# Patient Record
(demographics unavailable — no encounter records)

---

## 2025-02-06 NOTE — DISCUSSION/SUMMARY
[FreeTextEntry1] : Petit mal epilepsy with developmental impairment. Continue Zarontin 5 mL bid. Will get routine and 24 hr EEGs.  RTO 6 months. Note sent to Dr Harden(PCP) Total clinician time spent on 2/6/2025 is 36 minutes including preparing to see the patient, obtaining and/or reviewing and confirming history, performing a medically necessary and appropriate examination, counseling and educating the patient and/or family, documenting clinical information in the EHR and communicating and/or referring to other healthcare professionals.

## 2025-02-06 NOTE — CONSULT LETTER
[Dear  ___] : Dear  [unfilled], [Please see my note below.] : Please see my note below. [Sincerely,] : Sincerely, [FreeTextEntry1] : This is an update on DONALD QUAN  who I saw in the office today for a follow up. This is continuing active treatment of an existing pt. [FreeTextEntry3] : Dr Mcguire

## 2025-02-06 NOTE — HISTORY OF PRESENT ILLNESS
[FreeTextEntry1] : 10 year old male last seen on 8/1/2024. Pt has petit mal epilepsy. Last seizure was early 2023. Had video EEG at [facility] 3/23, confirmed Dx and pt treated with Zarontin 5 mL bid ever since. No toxicity so far. Pt had presented with a 1 month hx of increasingly more frequent spells of sudden onset bilateral eye rolling with unresponsive state, last 5 seconds, rapid recovery, no loss of postural tone. Episodes were happening about 2-3 times per day. Pt has developmental delay, walked at 2 yr old, sentences by 4 yr old. Currently in a special ed 8:1:1 5th grade class with [employer], OT and PT.  [facility] cousin with epilepsy and intellectual impairment. Birth: FTNSVD no cx. NKA. Routine EEG (6/22/24) showed brief burst of bilateral spike-wave activity. The 24 hr ambulatory EEG (7/17-7/18/24) showed 2 brief bursts of spike-wave activity.